# Patient Record
Sex: FEMALE | Race: BLACK OR AFRICAN AMERICAN | NOT HISPANIC OR LATINO | Employment: FULL TIME | URBAN - METROPOLITAN AREA
[De-identification: names, ages, dates, MRNs, and addresses within clinical notes are randomized per-mention and may not be internally consistent; named-entity substitution may affect disease eponyms.]

---

## 2020-06-17 ENCOUNTER — SEE NOTE (OUTPATIENT)
Dept: URBAN - METROPOLITAN AREA CLINIC 33 | Facility: CLINIC | Age: 30
Setting detail: DERMATOLOGY
End: 2020-06-17

## 2020-06-17 DIAGNOSIS — L70.0 ACNE VULGARIS: ICD-10-CM

## 2020-06-17 PROCEDURE — 99202 OFFICE O/P NEW SF 15 MIN: CPT

## 2020-06-17 RX ORDER — MOMETASONE FUROATE 1 MG/G
1 APPLICATION CREAM TOPICAL BID
Qty: 45 | Refills: 3
Start: 2020-06-17

## 2020-06-17 RX ORDER — CRISABOROLE 20 MG/G
1 APPLICATION OINTMENT TOPICAL BID
Qty: 60 | Refills: 1
Start: 2020-06-17

## 2022-09-20 ENCOUNTER — APPOINTMENT (RX ONLY)
Dept: URBAN - METROPOLITAN AREA CLINIC 45 | Facility: CLINIC | Age: 32
Setting detail: DERMATOLOGY
End: 2022-09-20

## 2022-09-20 DIAGNOSIS — L20.89 OTHER ATOPIC DERMATITIS: ICD-10-CM | Status: INADEQUATELY CONTROLLED

## 2022-09-20 PROBLEM — L20.84 INTRINSIC (ALLERGIC) ECZEMA: Status: ACTIVE | Noted: 2022-09-20

## 2022-09-20 PROCEDURE — ? COUNSELING

## 2022-09-20 PROCEDURE — ? ADDITIONAL NOTES

## 2022-09-20 PROCEDURE — ? PRESCRIPTION MEDICATION MANAGEMENT

## 2022-09-20 PROCEDURE — 99204 OFFICE O/P NEW MOD 45 MIN: CPT

## 2022-09-20 PROCEDURE — ? PRESCRIPTION

## 2022-09-20 RX ORDER — CRISABOROLE 20 MG/G
OINTMENT TOPICAL
Qty: 60 | Refills: 5 | Status: ERX | COMMUNITY
Start: 2022-09-20

## 2022-09-20 RX ADMIN — CRISABOROLE: 20 OINTMENT TOPICAL at 00:00

## 2022-09-20 ASSESSMENT — LOCATION SIMPLE DESCRIPTION DERM
LOCATION SIMPLE: RIGHT THIGH
LOCATION SIMPLE: LEFT THIGH

## 2022-09-20 ASSESSMENT — LOCATION DETAILED DESCRIPTION DERM
LOCATION DETAILED: RIGHT ANTERIOR PROXIMAL THIGH
LOCATION DETAILED: LEFT ANTERIOR PROXIMAL THIGH

## 2022-09-20 ASSESSMENT — LOCATION ZONE DERM: LOCATION ZONE: LEG

## 2022-09-20 NOTE — HPI: RASH
What Type Of Note Output Would You Prefer (Optional)?: Bullet Format
Is The Patient Presenting As Previously Scheduled?: Yes
How Severe Is Your Rash?: mild
Is This A New Presentation, Or A Follow-Up?: Rash
Additional History: Patient reports she’s been dealing with this for 15 years, previous doctors diagnosed it as atopic dermatitis. Patient wants to confirm if it is atopic dermatitis and what she can do to manage it. Has been treating flares with triamcinolone prn. \\n\\nNew patient.

## 2022-09-20 NOTE — PROCEDURE: PRESCRIPTION MEDICATION MANAGEMENT
Initiate Treatment: Eucrisa 2 % topical ointment \\nQuantity: 60.0 g  Days Supply: 30\\nSig: Apply bid to affected area of rash, prn for flaring
Detail Level: Zone
Continue Regimen: Triamcinolone .1% cream to eczematous patches prn flaring\\nCerave, Cetaphil, or Aveeno moisturizers.
Render In Strict Bullet Format?: No

## 2022-09-21 ENCOUNTER — RX ONLY (OUTPATIENT)
Age: 32
Setting detail: RX ONLY
End: 2022-09-21

## 2022-09-21 RX ORDER — TRIAMCINOLONE ACETONIDE 1 MG/G
CREAM TOPICAL BID
Qty: 453.6 | Refills: 0 | Status: ERX | COMMUNITY
Start: 2022-09-21

## 2023-03-22 NOTE — PROCEDURE: ADDITIONAL NOTES
Subjective:       Patient ID: Cleo Ford is a 68 y.o. female.    Chief Complaint: Depression (Grief after  passing)  The patient location is:  home   The chief complaint leading to consultation is:   Grief, needing counseling     Visit type: audiovisual    Face to Face time with patient: 10 mins  12 minutes of total time spent on the encounter, which includes face to face time and non-face to face time preparing to see the patient (eg, review of tests), Obtaining and/or reviewing separately obtained history, Documenting clinical information in the electronic or other health record, Independently interpreting results (not separately reported) and communicating results to the patient/family/caregiver, or Care coordination (not separately reported).         Each patient to whom he or she provides medical services by telemedicine is:  (1) informed of the relationship between the physician and patient and the respective role of any other health care provider with respect to management of the patient; and (2) notified that he or she may decline to receive medical services by telemedicine and may withdraw from such care at any time.    Notes:     over a year ago and going through grief   Recognizes she needs someone to talk to   Would like a therapy referral     No SI/hi     Review of Systems   Constitutional:  Negative for activity change and unexpected weight change.   HENT:  Negative for hearing loss, rhinorrhea and trouble swallowing.    Eyes:  Negative for discharge and visual disturbance.   Respiratory:  Negative for chest tightness and wheezing.    Cardiovascular:  Negative for chest pain and palpitations.   Gastrointestinal:  Negative for blood in stool, constipation, diarrhea and vomiting.   Endocrine: Negative for polydipsia and polyuria.   Genitourinary:  Negative for difficulty urinating, dysuria, hematuria and menstrual problem.   Musculoskeletal:  Negative for arthralgias, joint 
swelling and neck pain.   Neurological:  Negative for weakness and headaches.   Psychiatric/Behavioral:  Positive for dysphoric mood. Negative for confusion.        Objective:      Physical Exam  Constitutional:       Appearance: Normal appearance.   HENT:      Head: Normocephalic and atraumatic.   Pulmonary:      Effort: Pulmonary effort is normal.   Neurological:      General: No focal deficit present.      Mental Status: She is alert.   Psychiatric:         Mood and Affect: Mood normal.         Thought Content: Thought content normal.         Judgment: Judgment normal.       Assessment:       Problem List Items Addressed This Visit    None  Visit Diagnoses       Positive depression screening    -  Primary    I have reviewed the positive depression score which warrants active treatment with psychotherapy and/or medications.    Relevant Orders    Ambulatory referral/consult to Psychology    Grief                  Plan:             Positive depression screening  Comments:  I have reviewed the positive depression score which warrants active treatment with psychotherapy and/or medications.  Orders:  -     Ambulatory referral/consult to Psychology; Future; Expected date: 03/29/2023    Grief       I have used clinical judgement based on duration and functional status to consider definite necessity for treatment.   You have a referral for [facility]     If you do not hear from them- we discussed some other local options \    If you need a referral let me know     For NOW therapy- we discussed [facility]      Time spent: 20 minutes in face to face and with review prior and after of chart notes from specialists, in regards to diagnosis, prognosis, review of lab and test results, benefits of treatment as well as management of disease, counseling of patient and coordination of care between various health care providers .    
Additional Notes: Patient consent was obtained to proceed with the visit and recommended plan of care after discussion of all risks and benefits, including the risks of COVID-19 exposure.
Render Risk Assessment In Note?: yes
Detail Level: Simple